# Patient Record
Sex: MALE | Race: WHITE | NOT HISPANIC OR LATINO | Employment: UNEMPLOYED | URBAN - METROPOLITAN AREA
[De-identification: names, ages, dates, MRNs, and addresses within clinical notes are randomized per-mention and may not be internally consistent; named-entity substitution may affect disease eponyms.]

---

## 2017-06-30 ENCOUNTER — HOSPITAL ENCOUNTER (OUTPATIENT)
Dept: RADIOLOGY | Facility: HOSPITAL | Age: 62
Discharge: HOME | End: 2017-06-30
Attending: NURSE PRACTITIONER
Payer: MEDICAID

## 2017-06-30 DIAGNOSIS — F17.210 CIGARETTE SMOKER: ICD-10-CM

## 2017-06-30 DIAGNOSIS — F17.210 CIGARETTE SMOKER: Primary | ICD-10-CM

## 2017-06-30 PROCEDURE — 71020 XR CHEST PA AND LATERAL: CPT | Mod: TC,PO

## 2018-10-14 ENCOUNTER — HOSPITAL ENCOUNTER (EMERGENCY)
Facility: HOSPITAL | Age: 63
Discharge: HOME | End: 2018-10-14
Attending: SURGERY
Payer: MEDICAID

## 2018-10-14 VITALS
HEIGHT: 71 IN | HEART RATE: 96 BPM | BODY MASS INDEX: 24.5 KG/M2 | RESPIRATION RATE: 18 BRPM | SYSTOLIC BLOOD PRESSURE: 155 MMHG | TEMPERATURE: 98 F | WEIGHT: 175 LBS | OXYGEN SATURATION: 97 % | DIASTOLIC BLOOD PRESSURE: 100 MMHG

## 2018-10-14 DIAGNOSIS — S40.012A CONTUSION OF LEFT SHOULDER, INITIAL ENCOUNTER: ICD-10-CM
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
## 2018-10-14 PROCEDURE — 99283 EMERGENCY DEPT VISIT LOW MDM: CPT

## 2018-10-15 NOTE — ED PROVIDER NOTES
"Encounter Date: 10/14/2018       History     Chief Complaint   Patient presents with    Fall     Fall off of a 5' ladder from top rung landing on left side/shoulder; states he did not hit head, no LOC "it knocked the wind out of me."  C/O pain left shoulder blade pain/left arm pain radiating down to elbow, no deformity noted, able to extend arm up and out, (+) distal pulses and cap refill     A 63-year-old male here today after sustaining a fall from a 5 ft ladder prior to arrival.  Patient reports that when he fell a cable broke his fall any subsequently landed on his posterior left shoulder.  He has full range of motion of his left shoulder, but reports he is still having pain to the posterior area.  He denies any hospital pretreatment prior to arrival or injuries or pain elsewhere.          Review of patient's allergies indicates:  No Known Allergies  Past Medical History:   Diagnosis Date    Bipolar 1 disorder     Depression     Kidney stone      History reviewed. No pertinent surgical history.  No family history on file.  Social History     Tobacco Use    Smoking status: Current Every Day Smoker     Packs/day: 1.00     Types: Cigarettes    Smokeless tobacco: Never Used   Substance Use Topics    Alcohol use: No     Alcohol/week: 0.0 oz    Drug use: Yes     Types: Cocaine     Review of Systems   Musculoskeletal: Positive for arthralgias. Negative for back pain, joint swelling and neck pain.   Skin: Negative for rash and wound.   All other systems reviewed and are negative.      Physical Exam     Initial Vitals [10/14/18 1924]   BP Pulse Resp Temp SpO2   (!) 155/100 96 18 97.6 °F (36.4 °C) 97 %      MAP       --         Physical Exam    Nursing note and vitals reviewed.  Constitutional: He appears well-developed and well-nourished. He is not diaphoretic. No distress.   HENT:   Head: Normocephalic and atraumatic.   Right Ear: External ear normal.   Left Ear: External ear normal.   Nose: Nose normal. "   Mouth/Throat: Oropharynx is clear and moist.   Eyes: Conjunctivae and EOM are normal.   Neck: Normal range of motion.   No cervical vertebral tenderness, step-off, or crepitus.   Cardiovascular:   +2 radial pulse to LUE   Pulmonary/Chest: No respiratory distress.   Respirations are even nonlabored.   Musculoskeletal:   Full range of motion of left shoulder.  Mild tenderness over left scapula without crepitus or swelling.   Neurological: He is alert and oriented to person, place, and time. He has normal strength.   Skin: Skin is warm and dry.   No erythema, ecchymosis, or abrasions to affected area.   Psychiatric: He has a normal mood and affect. His behavior is normal. Thought content normal.         ED Course   Procedures  Labs Reviewed - No data to display       Imaging Results          X-Ray Shoulder 2 or More Views Left (Final result)  Result time 10/14/18 19:59:25    Final result by Stephen Vieira MD (10/14/18 19:59:25)                 Impression:      No acute fracture or dislocation.      Electronically signed by: Stephen Vieira MD  Date:    10/14/2018  Time:    19:59             Narrative:    EXAMINATION:  XR SHOULDER COMPLETE 2 OR MORE VIEWS LEFT    CLINICAL HISTORY:  XR SHOULDER COMPLETE 2 OR MORE VIEWS LEFT    COMPARISON:  None    FINDINGS:  Three views of the left shoulder were obtained.    No evidence of acute fracture or dislocation.  Bony mineralization is normal.  Soft tissues are unremarkable.   Mild degenerative changes.                                              Attending Attestation:     Physician Attestation Statement for NP/PA:   I reviewed the chart but I did not personally examine the patient. The face to face encounter was performed by the NP/PA.                     Clinical Impression:   1.  Fall from ladder, initial encounter.  2.  Contusion left shoulder, initial encounter.    Disposition:   Disposition: Discharged                        Pearl Livingston NP  10/14/18 2042       Wili  ANAHI Elizabeth MD  10/19/18 4537

## 2021-03-13 ENCOUNTER — HOSPITAL ENCOUNTER (EMERGENCY)
Facility: HOSPITAL | Age: 66
Discharge: HOME | End: 2021-03-13
Attending: FAMILY MEDICINE
Payer: MEDICARE

## 2021-03-13 VITALS
SYSTOLIC BLOOD PRESSURE: 165 MMHG | RESPIRATION RATE: 14 BRPM | OXYGEN SATURATION: 96 % | BODY MASS INDEX: 23.8 KG/M2 | HEART RATE: 72 BPM | WEIGHT: 170 LBS | TEMPERATURE: 99 F | DIASTOLIC BLOOD PRESSURE: 92 MMHG | HEIGHT: 71 IN

## 2021-03-13 DIAGNOSIS — S61.211A LACERATION OF LEFT INDEX FINGER WITHOUT FOREIGN BODY WITHOUT DAMAGE TO NAIL, INITIAL ENCOUNTER: Primary | ICD-10-CM

## 2021-03-13 PROCEDURE — 25000003 PHARM REV CODE 250: Mod: ER | Performed by: PHYSICIAN ASSISTANT

## 2021-03-13 PROCEDURE — 12001 RPR S/N/AX/GEN/TRNK 2.5CM/<: CPT | Mod: ER

## 2021-03-13 PROCEDURE — 99283 EMERGENCY DEPT VISIT LOW MDM: CPT | Mod: 25,ER

## 2021-03-13 RX ORDER — LIDOCAINE HYDROCHLORIDE 10 MG/ML
10 INJECTION, SOLUTION EPIDURAL; INFILTRATION; INTRACAUDAL; PERINEURAL
Status: COMPLETED | OUTPATIENT
Start: 2021-03-13 | End: 2021-03-13

## 2021-03-13 RX ADMIN — BACITRACIN ZINC, POLYMYXIN B SULFATE, NEOMYCIN SULFATE 1 EACH: 400; 5000; 3.5 OINTMENT TOPICAL at 07:03

## 2021-03-13 RX ADMIN — LIDOCAINE HYDROCHLORIDE 100 MG: 10 INJECTION, SOLUTION EPIDURAL; INFILTRATION; INTRACAUDAL at 06:03

## 2023-09-16 ENCOUNTER — HOSPITAL ENCOUNTER (EMERGENCY)
Facility: HOSPITAL | Age: 68
Discharge: HOME | End: 2023-09-16
Attending: FAMILY MEDICINE
Payer: MEDICARE

## 2023-09-16 VITALS
BODY MASS INDEX: 24.5 KG/M2 | OXYGEN SATURATION: 93 % | SYSTOLIC BLOOD PRESSURE: 156 MMHG | RESPIRATION RATE: 17 BRPM | HEIGHT: 71 IN | DIASTOLIC BLOOD PRESSURE: 83 MMHG | TEMPERATURE: 98 F | HEART RATE: 74 BPM | WEIGHT: 175 LBS

## 2023-09-16 DIAGNOSIS — N20.0 KIDNEY STONE: Primary | ICD-10-CM

## 2023-09-16 DIAGNOSIS — R11.10 VOMITING: ICD-10-CM

## 2023-09-16 LAB
ALBUMIN SERPL BCP-MCNC: 4.8 G/DL (ref 3.5–5.2)
ALP SERPL-CCNC: 106 U/L (ref 38–126)
ALT SERPL W/O P-5'-P-CCNC: 35 U/L (ref 10–44)
AMPHET+METHAMPHET UR QL: NEGATIVE
AMYLASE SERPL-CCNC: 56 U/L (ref 30–110)
ANION GAP SERPL CALC-SCNC: 10 MMOL/L (ref 8–16)
AST SERPL-CCNC: 35 U/L (ref 15–46)
BACTERIA #/AREA URNS AUTO: ABNORMAL /HPF
BARBITURATES UR QL SCN>200 NG/ML: NEGATIVE
BASOPHILS # BLD AUTO: 0.04 K/UL (ref 0–0.2)
BASOPHILS NFR BLD: 0.3 % (ref 0–1.9)
BENZODIAZ UR QL SCN>200 NG/ML: NEGATIVE
BILIRUB SERPL-MCNC: 0.5 MG/DL (ref 0.1–1)
BILIRUB UR QL STRIP: NEGATIVE
BZE UR QL SCN: NEGATIVE
CALCIUM SERPL-MCNC: 9.7 MG/DL (ref 8.7–10.5)
CANNABINOIDS UR QL SCN: NEGATIVE
CHLORIDE SERPL-SCNC: 101 MMOL/L (ref 95–110)
CLARITY UR REFRACT.AUTO: CLEAR
CO2 SERPL-SCNC: 29 MMOL/L (ref 23–29)
COLOR UR AUTO: YELLOW
CREAT SERPL-MCNC: 1.7 MG/DL (ref 0.5–1.4)
CREAT UR-MCNC: 264.5 MG/DL (ref 23–375)
DIFFERENTIAL METHOD: ABNORMAL
EOSINOPHIL # BLD AUTO: 0 K/UL (ref 0–0.5)
EOSINOPHIL NFR BLD: 0.2 % (ref 0–8)
ERYTHROCYTE [DISTWIDTH] IN BLOOD BY AUTOMATED COUNT: 12.8 % (ref 11.5–14.5)
EST. GFR  (NO RACE VARIABLE): 43.6 ML/MIN/1.73 M^2
ETHANOL SERPL-MCNC: <10 MG/DL
GLUCOSE SERPL-MCNC: 174 MG/DL (ref 70–110)
GLUCOSE UR QL STRIP: NEGATIVE
HCT VFR BLD AUTO: 48.6 % (ref 40–54)
HGB BLD-MCNC: 16 G/DL (ref 14–18)
HGB UR QL STRIP: ABNORMAL
IMM GRANULOCYTES # BLD AUTO: 0.1 K/UL (ref 0–0.04)
IMM GRANULOCYTES NFR BLD AUTO: 0.7 % (ref 0–0.5)
KETONES UR QL STRIP: NEGATIVE
LEUKOCYTE ESTERASE UR QL STRIP: NEGATIVE
LIPASE SERPL-CCNC: 68 U/L (ref 23–300)
LYMPHOCYTES # BLD AUTO: 2.1 K/UL (ref 1–4.8)
LYMPHOCYTES NFR BLD: 14.9 % (ref 18–48)
MCH RBC QN AUTO: 29.9 PG (ref 27–31)
MCHC RBC AUTO-ENTMCNC: 32.9 G/DL (ref 32–36)
MCV RBC AUTO: 91 FL (ref 82–98)
METHADONE UR QL SCN>300 NG/ML: NEGATIVE
MICROSCOPIC COMMENT: ABNORMAL
MONOCYTES # BLD AUTO: 0.7 K/UL (ref 0.3–1)
MONOCYTES NFR BLD: 4.9 % (ref 4–15)
NEUTROPHILS # BLD AUTO: 10.9 K/UL (ref 1.8–7.7)
NEUTROPHILS NFR BLD: 79 % (ref 38–73)
NITRITE UR QL STRIP: NEGATIVE
NRBC BLD-RTO: 0 /100 WBC
NT-PROBNP SERPL-MCNC: <20 PG/ML (ref 5–900)
OPIATES UR QL SCN: NEGATIVE
PCP UR QL SCN>25 NG/ML: NEGATIVE
PH UR STRIP: 6 [PH] (ref 5–8)
PLATELET # BLD AUTO: 285 K/UL (ref 150–450)
PMV BLD AUTO: 10.3 FL (ref 9.2–12.9)
POTASSIUM SERPL-SCNC: 5.1 MMOL/L (ref 3.5–5.1)
PROT SERPL-MCNC: 8.1 G/DL (ref 6–8.4)
PROT UR QL STRIP: NEGATIVE
RBC # BLD AUTO: 5.36 M/UL (ref 4.6–6.2)
RBC #/AREA URNS AUTO: 50 /HPF (ref 0–4)
SODIUM SERPL-SCNC: 140 MMOL/L (ref 136–145)
SP GR UR STRIP: >=1.03 (ref 1–1.03)
TOXICOLOGY INFORMATION: NORMAL
TROPONIN I SERPL-MCNC: <0.012 NG/ML (ref 0.01–0.03)
URN SPEC COLLECT METH UR: ABNORMAL
UROBILINOGEN UR STRIP-ACNC: NEGATIVE EU/DL
UUN UR-MCNC: 26 MG/DL (ref 2–20)
WBC # BLD AUTO: 13.78 K/UL (ref 3.9–12.7)
WBC #/AREA URNS AUTO: 0 /HPF (ref 0–5)

## 2023-09-16 PROCEDURE — 84484 ASSAY OF TROPONIN QUANT: CPT | Mod: ER | Performed by: EMERGENCY MEDICINE

## 2023-09-16 PROCEDURE — 93010 EKG 12-LEAD: ICD-10-PCS | Mod: ,,, | Performed by: INTERNAL MEDICINE

## 2023-09-16 PROCEDURE — 96372 THER/PROPH/DIAG INJ SC/IM: CPT | Performed by: EMERGENCY MEDICINE

## 2023-09-16 PROCEDURE — 82150 ASSAY OF AMYLASE: CPT | Mod: ER | Performed by: FAMILY MEDICINE

## 2023-09-16 PROCEDURE — 93010 ELECTROCARDIOGRAM REPORT: CPT | Mod: ,,, | Performed by: INTERNAL MEDICINE

## 2023-09-16 PROCEDURE — 82077 ASSAY SPEC XCP UR&BREATH IA: CPT | Mod: ER | Performed by: EMERGENCY MEDICINE

## 2023-09-16 PROCEDURE — 99900035 HC TECH TIME PER 15 MIN (STAT): Mod: ER

## 2023-09-16 PROCEDURE — 96375 TX/PRO/DX INJ NEW DRUG ADDON: CPT | Mod: ER

## 2023-09-16 PROCEDURE — 85025 COMPLETE CBC W/AUTO DIFF WBC: CPT | Mod: ER | Performed by: FAMILY MEDICINE

## 2023-09-16 PROCEDURE — 25000003 PHARM REV CODE 250: Mod: ER | Performed by: EMERGENCY MEDICINE

## 2023-09-16 PROCEDURE — 63600175 PHARM REV CODE 636 W HCPCS: Mod: ER | Performed by: EMERGENCY MEDICINE

## 2023-09-16 PROCEDURE — 96361 HYDRATE IV INFUSION ADD-ON: CPT | Mod: ER

## 2023-09-16 PROCEDURE — 81000 URINALYSIS NONAUTO W/SCOPE: CPT | Mod: 59,ER | Performed by: FAMILY MEDICINE

## 2023-09-16 PROCEDURE — 80307 DRUG TEST PRSMV CHEM ANLYZR: CPT | Mod: ER | Performed by: EMERGENCY MEDICINE

## 2023-09-16 PROCEDURE — 99285 EMERGENCY DEPT VISIT HI MDM: CPT | Mod: 25,ER

## 2023-09-16 PROCEDURE — 80053 COMPREHEN METABOLIC PANEL: CPT | Mod: ER | Performed by: FAMILY MEDICINE

## 2023-09-16 PROCEDURE — 93005 ELECTROCARDIOGRAM TRACING: CPT | Mod: ER

## 2023-09-16 PROCEDURE — 83880 ASSAY OF NATRIURETIC PEPTIDE: CPT | Mod: ER | Performed by: FAMILY MEDICINE

## 2023-09-16 PROCEDURE — 96374 THER/PROPH/DIAG INJ IV PUSH: CPT | Mod: ER

## 2023-09-16 PROCEDURE — 83690 ASSAY OF LIPASE: CPT | Mod: ER | Performed by: FAMILY MEDICINE

## 2023-09-16 RX ORDER — DICLOFENAC SODIUM 75 MG/1
75 TABLET, DELAYED RELEASE ORAL 2 TIMES DAILY
Qty: 60 TABLET | Refills: 0 | Status: SHIPPED | OUTPATIENT
Start: 2023-09-16

## 2023-09-16 RX ORDER — TAMSULOSIN HYDROCHLORIDE 0.4 MG/1
0.4 CAPSULE ORAL
Status: COMPLETED | OUTPATIENT
Start: 2023-09-16 | End: 2023-09-16

## 2023-09-16 RX ORDER — ONDANSETRON 2 MG/ML
4 INJECTION INTRAMUSCULAR; INTRAVENOUS
Status: COMPLETED | OUTPATIENT
Start: 2023-09-16 | End: 2023-09-16

## 2023-09-16 RX ORDER — KETOROLAC TROMETHAMINE 30 MG/ML
15 INJECTION, SOLUTION INTRAMUSCULAR; INTRAVENOUS
Status: COMPLETED | OUTPATIENT
Start: 2023-09-16 | End: 2023-09-16

## 2023-09-16 RX ORDER — ONDANSETRON 4 MG/1
4 TABLET, FILM COATED ORAL EVERY 6 HOURS
Qty: 12 TABLET | Refills: 0 | Status: SHIPPED | OUTPATIENT
Start: 2023-09-16

## 2023-09-16 RX ORDER — OXYCODONE AND ACETAMINOPHEN 5; 325 MG/1; MG/1
1 TABLET ORAL
Status: COMPLETED | OUTPATIENT
Start: 2023-09-16 | End: 2023-09-16

## 2023-09-16 RX ORDER — HALOPERIDOL 5 MG/ML
5 INJECTION INTRAMUSCULAR
Status: COMPLETED | OUTPATIENT
Start: 2023-09-16 | End: 2023-09-16

## 2023-09-16 RX ORDER — TAMSULOSIN HYDROCHLORIDE 0.4 MG/1
0.4 CAPSULE ORAL DAILY
Qty: 10 CAPSULE | Refills: 0 | Status: SHIPPED | OUTPATIENT
Start: 2023-09-16 | End: 2024-09-15

## 2023-09-16 RX ADMIN — TAMSULOSIN HYDROCHLORIDE 0.4 MG: 0.4 CAPSULE ORAL at 08:09

## 2023-09-16 RX ADMIN — HALOPERIDOL LACTATE 5 MG: 5 INJECTION, SOLUTION INTRAMUSCULAR at 06:09

## 2023-09-16 RX ADMIN — KETOROLAC TROMETHAMINE 15 MG: 30 INJECTION, SOLUTION INTRAMUSCULAR; INTRAVENOUS at 06:09

## 2023-09-16 RX ADMIN — OXYCODONE HYDROCHLORIDE AND ACETAMINOPHEN 1 TABLET: 5; 325 TABLET ORAL at 08:09

## 2023-09-16 RX ADMIN — SODIUM CHLORIDE 1000 ML: 9 INJECTION, SOLUTION INTRAVENOUS at 06:09

## 2023-09-16 RX ADMIN — ONDANSETRON 4 MG: 2 INJECTION INTRAMUSCULAR; INTRAVENOUS at 06:09

## 2023-09-16 NOTE — ED NOTES
Family member left phone number for patient when he is ready for discharge: Tory [phone]; Darline [phone].

## 2023-09-16 NOTE — ED PROVIDER NOTES
Encounter Date: 9/16/2023       History     Chief Complaint   Patient presents with    Abdominal Pain     Patient presents to ED with Right lower quad abdominal pain that started 04:15 pm with nausea and vomiting. He is diaphoretic in triage.      HPI  67 y.o.   Co sudden onset RLQ/R flank pain and emesis w/o diarrhea x about 1.5 hrs  Denies h/o this  Denies pshx to abd  No exac/remitting factors  Denies etoh, drugs  Chart review shows cocaine  Hx from patient, limited due to cooperation/vomiting    Review of patient's allergies indicates:  No Known Allergies  Past Medical History:   Diagnosis Date    Bipolar 1 disorder     Diabetes mellitus     Hypertension      History reviewed. No pertinent surgical history.  History reviewed. No pertinent family history.  Social History     Tobacco Use    Smoking status: Every Day     Current packs/day: 1.00     Types: Cigarettes    Smokeless tobacco: Never   Substance Use Topics    Alcohol use: No     Alcohol/week: 0.0 standard drinks of alcohol    Drug use: Yes     Types: Cocaine     Review of Systems  All systems were reviewed/examined and were negative except as noted in the HPI.    Limited due to emesis  Physical Exam     Initial Vitals [09/16/23 1718]   BP Pulse Resp Temp SpO2   136/67 (!) 55 16 98.1 °F (36.7 °C) 97 %      MAP       --         Physical Exam    General: the patient is awake, alert, and vomiting  Head: normocephalic and atraumatic, sclera are clear  Neck: supple without meningismus  Chest: clear to auscultation bilaterally, no respiratory distress  Heart: regular rate and rhythm borderline glen  ABD soft, nontender, nondistended, no peritoneal signs  Extremities: warm and well perfused  Skin: warm and dry  Psych conversant  Neuro: awake, alert, moving all extremities    ED Course   Procedures  Labs Reviewed   CBC W/ AUTO DIFFERENTIAL - Abnormal; Notable for the following components:       Result Value    WBC 13.78 (*)     Immature Granulocytes 0.7 (*)      Gran # (ANC) 10.9 (*)     Immature Grans (Abs) 0.10 (*)     Gran % 79.0 (*)     Lymph % 14.9 (*)     All other components within normal limits   COMPREHENSIVE METABOLIC PANEL - Abnormal; Notable for the following components:    Glucose 174 (*)     BUN 26 (*)     Creatinine 1.70 (*)     eGFR 43.6 (*)     All other components within normal limits   URINALYSIS, REFLEX TO URINE CULTURE - Abnormal; Notable for the following components:    Specific Gravity, UA >=1.030 (*)     Occult Blood UA 3+ (*)     All other components within normal limits    Narrative:     Preferred Collection Type->Urine, Clean Catch  Specimen Source->Urine   URINALYSIS MICROSCOPIC - Abnormal; Notable for the following components:    RBC, UA 50 (*)     All other components within normal limits    Narrative:     Preferred Collection Type->Urine, Clean Catch  Specimen Source->Urine   NT-PRO NATRIURETIC PEPTIDE   AMYLASE   LIPASE   TROPONIN I   ALCOHOL,MEDICAL (ETHANOL)   DRUG SCREEN PANEL, URINE EMERGENCY    Narrative:     Preferred Collection Type->Urine, Clean Catch  Specimen Source->Urine          Imaging Results              CT Abdomen Pelvis  Without Contrast (Final result)  Result time 09/16/23 20:03:28      Final result by Danny Kurtz MD (09/16/23 20:03:28)                   Impression:      Right-sided hydronephrosis and right ureter.  A passed stone on the right side near the right UVJ measuring 2 mm. No left-sided hydronephrosis.  Mild right perinephric fat stranding.    All CT scans   are performed using dose optimization techniques including the following: automated exposure control; adjustment of the mA and/or kV; use of iterative reconstruction technique.  Dose modulation was employed for ALARA by means of: Automated exposure control; adjustment of the mA and/or kV according to patient size (this includes techniques or standardized protocols for targeted exams where dose is matched to indication/reason for exam; i.e. extremities or  head); and/or use of iterative reconstructive technique.      Electronically signed by: Danny Tessie  Date:    09/16/2023  Time:    20:03               Narrative:    EXAMINATION:  CT ABDOMEN PELVIS WITHOUT CONTRAST    CLINICAL HISTORY:  Abdominal pain, acute, nonlocalized;    TECHNIQUE:  Low dose axial images, sagittal and coronal reformations were obtained from the lung bases to the pubic symphysis, .    COMPARISON:  None    FINDINGS:  Heart: Normal in size as far as seen.  No pericardial effusion as far seen.    Lung Bases: Well aerated, without consolidation or pleural fluid.    Liver: Normal in size and attenuation, with no focal hepatic lesions.    Gallbladder: No calcified gallstones.    Bile Ducts: No evidence of dilated ducts.    Pancreas: No mass or peripancreatic fat stranding.    Spleen: Unremarkable.    Adrenals: Unremarkable.    Kidneys/ Ureters: Right-sided hydronephrosis and right ureter.  A passed stone on the right side near the right UVJ measuring 2 mm.  No left-sided hydronephrosis.  Mild right perinephric fat stranding.    Bladder: No evidence of wall thickening.    Reproductive organs: Unremarkable.    GI Tract/Mesentery: No evidence of bowel obstruction or inflammation. No secondary signs of appendicitis.  Hiatal hernia.  Colonic diverticulosis I think only with the lower    Peritoneal Space: No ascites. No free air.    Retroperitoneum: No significant adenopathy.    Abdominal wall: Unremarkable.    Vasculature: No aneurysm.  Atherosclerotic changes.    Bones: No acute fracture.                                       Medications   ondansetron injection 4 mg (4 mg Intravenous Given 9/16/23 1833)   sodium chloride 0.9% bolus 1,000 mL 1,000 mL (0 mLs Intravenous Stopped 9/16/23 2055)   ketorolac injection 15 mg (15 mg Intravenous Given 9/16/23 1841)   haloperidol lactate injection 5 mg (5 mg Intramuscular Given 9/16/23 1842)   tamsulosin 24 hr capsule 0.4 mg (0.4 mg Oral Given 9/16/23 2013)    oxyCODONE-acetaminophen 5-325 mg per tablet 1 tablet (1 tablet Oral Given 9/16/23 2013)     Medical Decision Making  Amount and/or Complexity of Data Reviewed  Labs: ordered.  Radiology: ordered.    Risk  Prescription drug management.                  Medical Decision Making:    This is an emergent evaluation of a patient presenting to the ED.  Nursing notes were reviewed.  Kidney stone  I reviewed radiology images personally along with interpretations.  I personally reviewed and interpreted the laboratory results.  No uti    I decided to obtain and review old medical records, which showed: ED visits    Evaluation for Emergency Medical Condition  The patient received a medical screening exam and within a reasonable degree of clinical confidence an emergency medical condition has not been identified.  The patient is instructed on proper follow up and return precautions to the ED.    The patient was encouraged strongly to get the COVID-19 vaccine either after asymptomatic (if COVID positive) or offered it here in the ED is COVID negative.  The patient was also encouraged to obtain an influenza vaccination if available once asymptomatic (if positive) or if testing negative in the ED.    The patient was encouraged and counseled to quit smoking and use of tobacco products, including the effect on their health.  Smoking cessation resources were provided.      Luis Browning MD, JEANNIE       Uro ref  Improved            Clinical Impression:   Final diagnoses:  [R11.10] Vomiting  [N20.0] Kidney stone (Primary)        ED Disposition Condition    Discharge Stable          ED Prescriptions       Medication Sig Dispense Start Date End Date Auth. Provider    diclofenac (VOLTAREN) 75 MG EC tablet Take 1 tablet (75 mg total) by mouth 2 (two) times daily. 60 tablet 9/16/2023 -- Dickson Browning MD    ondansetron (ZOFRAN) 4 MG tablet Take 1 tablet (4 mg total) by mouth every 6 (six) hours. 12 tablet 9/16/2023 -- Dickson Browning MD     tamsulosin (FLOMAX) 0.4 mg Cap Take 1 capsule (0.4 mg total) by mouth once daily. 10 capsule 9/16/2023 9/15/2024 Dickson Browning MD          Follow-up Information       Follow up With Specialties Details Why Contact Info Additional Information    Rafia Stubbs, NP Family Medicine Schedule an appointment as soon as possible for a visit   [address]  [address]  [place] [address]  [phone]       [facility] U[facility] Urology Schedule an appointment as soon as possible for a visit   [address], [address]  [place] [address] [phone] Please park in surface lot and check in at [address].          Discharged to home in stable condition, return to ED warnings given, follow up and patient care instructions given.      Luis Browning MD, JEANNIE, FACEP  Department of Emergency Medicine       Dickson Browning MD  09/17/23 5657     no

## 2023-09-17 NOTE — ED NOTES
"Patient is being verbally abusive to nursing and radiology staff. He initially did told radiology that he would not be able to lay flat to do the CT scan because it hurts his back and  makes him nauseated. I went in and informed him that the CT needed to be done so that we could help him to figure out what was going on. He then stated," I don't know what the hell she is talking about". Patient eventually went to CT, but was cursing at the radiology tech while she was trying to perform care.   "

## 2023-09-17 NOTE — ED NOTES
Patient had no c/o pain upon walking out of ER with his niece. Patient was sleeping and would not stay wake for discharge instructions. No vomiting since Haldol given.

## 2024-02-28 ENCOUNTER — HOSPITAL ENCOUNTER (OUTPATIENT)
Dept: RADIOLOGY | Facility: HOSPITAL | Age: 69
Discharge: HOME | End: 2024-02-28
Attending: INTERNAL MEDICINE
Payer: MEDICARE

## 2024-02-28 DIAGNOSIS — Z72.0 TOBACCO ABUSE: ICD-10-CM

## 2024-02-28 DIAGNOSIS — Z72.0 TOBACCO ABUSE: Primary | ICD-10-CM

## 2024-02-28 PROCEDURE — 76706 US ABDL AORTA SCREEN AAA: CPT | Mod: TC,PN

## 2024-02-28 PROCEDURE — 76706 US ABDL AORTA SCREEN AAA: CPT | Mod: 26,,, | Performed by: RADIOLOGY

## 2024-03-01 DIAGNOSIS — Z72.0 TOBACCO USE: Primary | ICD-10-CM

## 2024-03-05 DIAGNOSIS — F17.210 CIGARETTE SMOKER: Primary | ICD-10-CM

## 2024-03-11 ENCOUNTER — HOSPITAL ENCOUNTER (OUTPATIENT)
Dept: RADIOLOGY | Facility: HOSPITAL | Age: 69
Discharge: HOME | End: 2024-03-11
Attending: INTERNAL MEDICINE
Payer: MEDICARE

## 2024-03-11 DIAGNOSIS — F17.210 CIGARETTE SMOKER: ICD-10-CM

## 2024-03-11 PROCEDURE — 71271 CT THORAX LUNG CANCER SCR C-: CPT | Mod: 26,,, | Performed by: RADIOLOGY

## 2024-03-11 PROCEDURE — 71271 CT THORAX LUNG CANCER SCR C-: CPT | Mod: TC,PN

## 2024-07-04 ENCOUNTER — HOSPITAL ENCOUNTER (EMERGENCY)
Facility: HOSPITAL | Age: 69
Discharge: HOME | End: 2024-07-04
Attending: EMERGENCY MEDICINE
Payer: MEDICARE

## 2024-07-04 ENCOUNTER — HOSPITAL ENCOUNTER (EMERGENCY)
Facility: HOSPITAL | Age: 69
Discharge: HOME | End: 2024-07-04
Attending: FAMILY MEDICINE
Payer: MEDICARE

## 2024-07-04 VITALS
HEIGHT: 71 IN | RESPIRATION RATE: 19 BRPM | SYSTOLIC BLOOD PRESSURE: 101 MMHG | TEMPERATURE: 98 F | OXYGEN SATURATION: 100 % | WEIGHT: 179.56 LBS | BODY MASS INDEX: 25.14 KG/M2 | HEART RATE: 76 BPM | DIASTOLIC BLOOD PRESSURE: 53 MMHG

## 2024-07-04 VITALS
OXYGEN SATURATION: 99 % | RESPIRATION RATE: 17 BRPM | HEART RATE: 91 BPM | DIASTOLIC BLOOD PRESSURE: 66 MMHG | BODY MASS INDEX: 25.2 KG/M2 | WEIGHT: 180 LBS | SYSTOLIC BLOOD PRESSURE: 111 MMHG | HEIGHT: 71 IN | TEMPERATURE: 98 F

## 2024-07-04 DIAGNOSIS — F15.10 AMPHETAMINE ABUSE: ICD-10-CM

## 2024-07-04 DIAGNOSIS — F15.10 METHAMPHETAMINE ABUSE, EPISODIC: Primary | ICD-10-CM

## 2024-07-04 DIAGNOSIS — F14.10 COCAINE ABUSE: ICD-10-CM

## 2024-07-04 DIAGNOSIS — F23 ACUTE PSYCHOSIS: Primary | ICD-10-CM

## 2024-07-04 LAB
ALBUMIN SERPL BCP-MCNC: 4.6 G/DL (ref 3.5–5.2)
ALP SERPL-CCNC: 77 U/L (ref 38–126)
ALT SERPL W/O P-5'-P-CCNC: 36 U/L (ref 10–44)
AMPHET+METHAMPHET UR QL: ABNORMAL
ANION GAP SERPL CALC-SCNC: 13 MMOL/L (ref 8–16)
APAP SERPL-MCNC: <10 UG/ML (ref 10–20)
AST SERPL-CCNC: 39 U/L (ref 15–46)
BACTERIA #/AREA URNS AUTO: ABNORMAL /HPF
BARBITURATES UR QL SCN>200 NG/ML: NEGATIVE
BASOPHILS # BLD AUTO: 0.03 K/UL (ref 0–0.2)
BASOPHILS NFR BLD: 0.4 % (ref 0–1.9)
BENZODIAZ UR QL SCN>200 NG/ML: NEGATIVE
BILIRUB SERPL-MCNC: 0.6 MG/DL (ref 0.1–1)
BILIRUB UR QL STRIP: NEGATIVE
BZE UR QL SCN: ABNORMAL
CALCIUM SERPL-MCNC: 9.3 MG/DL (ref 8.7–10.5)
CANNABINOIDS UR QL SCN: NEGATIVE
CHLORIDE SERPL-SCNC: 96 MMOL/L (ref 95–110)
CLARITY UR REFRACT.AUTO: CLEAR
CO2 SERPL-SCNC: 30 MMOL/L (ref 23–29)
COLOR UR AUTO: YELLOW
CREAT SERPL-MCNC: 1.17 MG/DL (ref 0.5–1.4)
CREAT UR-MCNC: 266.6 MG/DL (ref 23–375)
DIFFERENTIAL METHOD BLD: ABNORMAL
EOSINOPHIL # BLD AUTO: 0 K/UL (ref 0–0.5)
EOSINOPHIL NFR BLD: 0 % (ref 0–8)
ERYTHROCYTE [DISTWIDTH] IN BLOOD BY AUTOMATED COUNT: 12.8 % (ref 11.5–14.5)
EST. GFR  (NO RACE VARIABLE): >60 ML/MIN/1.73 M^2
ETHANOL SERPL-MCNC: <10 MG/DL
GLUCOSE SERPL-MCNC: 104 MG/DL (ref 70–110)
GLUCOSE UR QL STRIP: NEGATIVE
HCT VFR BLD AUTO: 45.3 % (ref 40–54)
HGB BLD-MCNC: 15.1 G/DL (ref 14–18)
HGB UR QL STRIP: NEGATIVE
HYALINE CASTS UR QL AUTO: 5 /LPF
IMM GRANULOCYTES # BLD AUTO: 0.04 K/UL (ref 0–0.04)
IMM GRANULOCYTES NFR BLD AUTO: 0.5 % (ref 0–0.5)
KETONES UR QL STRIP: ABNORMAL
LEUKOCYTE ESTERASE UR QL STRIP: ABNORMAL
LYMPHOCYTES # BLD AUTO: 1 K/UL (ref 1–4.8)
LYMPHOCYTES NFR BLD: 13.6 % (ref 18–48)
MCH RBC QN AUTO: 30.1 PG (ref 27–31)
MCHC RBC AUTO-ENTMCNC: 33.3 G/DL (ref 32–36)
MCV RBC AUTO: 90 FL (ref 82–98)
METHADONE UR QL SCN>300 NG/ML: NEGATIVE
MICROSCOPIC COMMENT: ABNORMAL
MONOCYTES # BLD AUTO: 0.9 K/UL (ref 0.3–1)
MONOCYTES NFR BLD: 11.2 % (ref 4–15)
NEUTROPHILS # BLD AUTO: 5.6 K/UL (ref 1.8–7.7)
NEUTROPHILS NFR BLD: 74.3 % (ref 38–73)
NITRITE UR QL STRIP: NEGATIVE
NRBC BLD-RTO: 0 /100 WBC
OPIATES UR QL SCN: NEGATIVE
PCP UR QL SCN>25 NG/ML: NEGATIVE
PH UR STRIP: 6 [PH] (ref 5–8)
PLATELET # BLD AUTO: 236 K/UL (ref 150–450)
PMV BLD AUTO: 9.7 FL (ref 9.2–12.9)
POTASSIUM SERPL-SCNC: 4 MMOL/L (ref 3.5–5.1)
PROT SERPL-MCNC: 7.7 G/DL (ref 6–8.4)
PROT UR QL STRIP: ABNORMAL
RBC # BLD AUTO: 5.02 M/UL (ref 4.6–6.2)
RBC #/AREA URNS AUTO: 1 /HPF (ref 0–4)
SODIUM SERPL-SCNC: 139 MMOL/L (ref 136–145)
SP GR UR STRIP: >=1.03 (ref 1–1.03)
TOXICOLOGY INFORMATION: ABNORMAL
URN SPEC COLLECT METH UR: ABNORMAL
UROBILINOGEN UR STRIP-ACNC: 1 EU/DL
UUN UR-MCNC: 23 MG/DL (ref 2–20)
WBC # BLD AUTO: 7.56 K/UL (ref 3.9–12.7)
WBC #/AREA URNS AUTO: 10 /HPF (ref 0–5)

## 2024-07-04 PROCEDURE — 81000 URINALYSIS NONAUTO W/SCOPE: CPT | Mod: 59,ER | Performed by: FAMILY MEDICINE

## 2024-07-04 PROCEDURE — 82077 ASSAY SPEC XCP UR&BREATH IA: CPT | Mod: ER | Performed by: FAMILY MEDICINE

## 2024-07-04 PROCEDURE — 80307 DRUG TEST PRSMV CHEM ANLYZR: CPT | Mod: ER | Performed by: FAMILY MEDICINE

## 2024-07-04 PROCEDURE — 85025 COMPLETE CBC W/AUTO DIFF WBC: CPT | Mod: ER | Performed by: FAMILY MEDICINE

## 2024-07-04 PROCEDURE — 99285 EMERGENCY DEPT VISIT HI MDM: CPT | Mod: 27,ER

## 2024-07-04 PROCEDURE — 80053 COMPREHEN METABOLIC PANEL: CPT | Mod: ER | Performed by: FAMILY MEDICINE

## 2024-07-04 PROCEDURE — 25000003 PHARM REV CODE 250: Mod: ER | Performed by: FAMILY MEDICINE

## 2024-07-04 PROCEDURE — 99283 EMERGENCY DEPT VISIT LOW MDM: CPT | Mod: ER

## 2024-07-04 PROCEDURE — 80143 DRUG ASSAY ACETAMINOPHEN: CPT | Mod: ER | Performed by: FAMILY MEDICINE

## 2024-07-04 RX ORDER — DIPHENHYDRAMINE HCL 50 MG
50 CAPSULE ORAL
Status: COMPLETED | OUTPATIENT
Start: 2024-07-04 | End: 2024-07-04

## 2024-07-04 RX ADMIN — DIPHENHYDRAMINE HYDROCHLORIDE 50 MG: 50 CAPSULE ORAL at 10:07

## 2024-07-04 NOTE — ED NOTES
This RN contacted [facility]     Spoke with Colonel Carrillo to confirm pts medical necessity after advising patient was just seen here, evaluated and discharged. Colonel Carrillo advises if patient is discharged they will send patient back to facility. Stating [employer] did not want to press charges when patient was alleged to have attempted to break in to business    Pt denies allegations.  Pt denies SI, HI, AH or VH.

## 2024-07-04 NOTE — CONSULTS
[facility]  Psychiatry  Telepsychiatry Consult Note    Please see previous notes:    Patient agreeable to consultation via telepsychiatry.    Tele-Consultation from Psychiatry started: 7/4/2024 at 8:53PM  The chief complaint leading to psychiatric consultation is: Substance abuse, psychosis  This consultation was requested by Dr. Haque, the Emergency Department attending physician.  The location of the consulting psychiatrist is  [place] .  The patient location is  [facility] EMERGENCY DEPARTMENT   The patient arrived at the ED at: 0617    Also present with the patient at the time of the consultation: NA    Patient Identification:   Braxton Ayoub is a 68 y.o. male.    Patient information was obtained from patient, past medical records, and ER records.      Inpatient consult to Telemedicine - Psychiatry  Consult performed by: Nayely Zuleta MD  Consult ordered by: Molina Haque MD  Reason for consult: Substance abuse, psychosis        Teleconsult Time Documentation  Subjective:     History of Present Illness:  Per ED MD note:    68-year-old male brought to ED by EMS.  Patient has been discharged from ED few hours ago for wandering on streets and medical evaluation.  Dr. Llanos did not think he qualifies for PEC.  Earlier patient told staff that he did Meth.  Police claims he did not do crime at [facility].  And he has no charges.  Patient claims he used to work at [employer] and is now retired.  Patient wanted to be discharged and will walk home since it is daylight now.      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  Patient reports he consumed a moderate amount of methamphetamines today.  He was found wandering in the road endorsing auditory and visual hallucinations EMS was activated and brought patient to the ER for further evaluation.  Patient arrived in the ER no  "acute distress he was acting appropriate he denies any HI or SI no longer has auditory hallucinations.  He was no complaints at this time       Psychiatry Eval  Pt seen via secure video visit. Reports that he is in the ED for "not a thing." Says that they had released him when he came in earlier, needed to call for a ride, says that the security guards wouldn't let him make a phone call and put him out on the streets. Reports he went over the [employer] where he works, but that he works a different shift and the people working night shift didn't know him, police were called and he was brought back to the ED. Per notes, police did not arrest patient, he did not commit a crime at [facility]. Says now he would like to leave and they won't let him walk home, even though they put him out to do this in the middle of hte night. Tried to call his 2 nieces to come get him but no one answered, rest of his numbers are in his cell phone which is at home.   Reports that he is supposed to work today, unable to call [employer], so worried about getting a point against him for missing work. Reports he has been hospitalized for depression and drug problems but that it was a very long time ago, when he was in his 30's. Reports drug use has been better than before. Mainly uses cocaine, says not as much as he used to.   Denies any suicidal thoughts, no homicidal thoughts. Would like to get home one way or another, whether he walks or gets a ride. Denies other concerns.     Psychiatric History:   Previous Psychiatric Hospitalizations: Yes Reports "a long time ago". Chart revieiw with one admission noted in 2016 for depression in context of substance use, 2 prior mentioned in 2003, 2008 for similar  Previous Medication Trials: Yes Remote, nothing recent per pt  Previous Suicide Attempts: no   History of Violence: Denies  History of Depression: Yes, remote  History of Arabella: Denies  History of Auditory/Visual Hallucination In context of stimulant " "use  History of Delusions: In context of drug use  Outpatient psychiatrist (current & past): No    Substance Abuse History:  Tobacco:Yes  Alcohol: No  Illicit Substances:Yes, cocaine, meth  Detox/Rehab: Yes, remote    Legal History: Past charges/incarcerations: No current charges, per chart review, incarcerated for 2 years after breaking into a [employer] the [facility].    Family Psychiatric History: Deferred      Social History:  *Education:Associate's Degree  Employment Status/Finances:Employed   Relationship Status/Sexual Orientation: Single:    Children: 0  Housing Status: Home    history:  NO  Access to gun: NO  Pentecostalism:Spiritual without formal affiliation    Psychiatric Mental Status Exam:  Arousal: alert  Sensorium/Orientation: oriented to grossly intact  Behavior/Cooperation: restless and fidgety    Speech: normal rate, normal volume, mumbles, but also covers face with mask/hands  Language: grossly intact  Mood: " Just want to go home "   Affect: irritable  Thought Process:  Fairly linear, occ circumstantial  Thought Content:   Auditory hallucinations: NO  Visual hallucinations: NO  Paranoia: NO  Delusions:  NO  Suicidal ideation: NO  Homicidal ideation: NO  Attention/Concentration:  intact  Memory:    Recent:  Intact   Remote: Intact  Insight: has awareness of illness  Judgment: behavior is adequate to circumstances, was limited by intoxication      Past Medical History:   Past Medical History:   Diagnosis Date    Bipolar 1 disorder     Diabetes mellitus     Hypertension       Laboratory Data:   Labs Reviewed   DRUG SCREEN PANEL, URINE EMERGENCY - Abnormal; Notable for the following components:       Result Value    Cocaine (Metab.) Presumptive Positive (*)     All other components within normal limits    Narrative:     Preferred Collection Type->Urine, Clean Catch  Specimen Source->Urine   URINALYSIS, REFLEX TO URINE CULTURE - Abnormal; Notable for the following components:    Specific Gravity, UA " >=1.030 (*)     Protein, UA 1+ (*)     Ketones, UA Trace (*)     Leukocytes, UA Trace (*)     All other components within normal limits    Narrative:     Preferred Collection Type->Urine, Clean Catch  Specimen Source->Urine   URINALYSIS MICROSCOPIC - Abnormal; Notable for the following components:    WBC, UA 10 (*)     Hyaline Casts, UA 5 (*)     All other components within normal limits    Narrative:     Preferred Collection Type->Urine, Clean Catch  Specimen Source->Urine   CBC W/ AUTO DIFFERENTIAL - Abnormal; Notable for the following components:    Gran % 74.3 (*)     Lymph % 13.6 (*)     All other components within normal limits   COMPREHENSIVE METABOLIC PANEL - Abnormal; Notable for the following components:    CO2 30 (*)     BUN 23 (*)     All other components within normal limits   ALCOHOL,MEDICAL (ETHANOL)   ACETAMINOPHEN LEVEL       Allergies:   Review of patient's allergies indicates:  No Known Allergies    Medications in ER: Medications - No data to display    Medications at home:   No current facility-administered medications on file prior to encounter.     Current Outpatient Medications on File Prior to Encounter   Medication Sig Dispense Refill    diclofenac (VOLTAREN) 75 MG EC tablet Take 1 tablet (75 mg total) by mouth 2 (two) times daily. 60 tablet 0    ondansetron (ZOFRAN) 4 MG tablet Take 1 tablet (4 mg total) by mouth every 6 (six) hours. 12 tablet 0    tamsulosin (FLOMAX) 0.4 mg Cap Take 1 capsule (0.4 mg total) by mouth once daily. 10 capsule 0        No new subjective & objective note has been filed under this hospital service since the last note was generated.      Assessment - Diagnosis - Goals:     Diagnosis/Impression:   67 yo male with hx of stimulant use d/o, previous presentations for SI, psychosis in context of substance use, but none in our system since 2016. Initially presented earlier today with hallucinations/paranoia, related to substance intoxication, cleared and was discharged,  but brought back after he was found wandering outside. On interview, pt denies suicidality, does endorse cocaine use, which likely contributes to initial presentation. Symptoms largely driven by acute stimulant use and suspect long term cognitive effects of drug use. Does not meet criteria for involuntary hospitalization at this time.     DDX  Stimulant use d/o  Stimulant intoxication    Rec:   Legal: Does not meet criteria for PEC, not a danger to self or others, nor gravely disabled.   No scheduled meds indicated  Pt expressed willingness to consider outpatient substance abuse treatment options, cna provide in instructions.      Time with patient: 18 min  Additiona time (chart review, documentation): 15 min      More than 50% of the time was spent counseling/coordinating care    Consulting clinician was informed of the encounter and consult note.    Consultation ended: 7/4/2024 at 9:35 AM    Nayely Zuleta MD   Psychiatry  [facility]

## 2024-07-04 NOTE — ED NOTES
Pt provided with a pitcher of water.   Pt notified that a urine sample is needed and provided with a urinal.

## 2024-07-04 NOTE — ED PROVIDER NOTES
"Encounter Date: 7/4/2024       History     Chief Complaint   Patient presents with    Ingestion     Pt [facility] , unit # [id number] reports pt was found by PD wandering in the road with AH and VH reporting he took "copious" amt of "meth"      HPI    This is a 68-year-old male who presents the ER for evaluation of meth induced mood disorder.  Patient reports he consumed a moderate amount of methamphetamines today.  He was found wandering in the road endorsing auditory and visual hallucinations EMS was activated and brought patient to the ER for further evaluation.  Patient arrived in the ER no acute distress he was acting appropriate he denies any HI or SI no longer has auditory hallucinations.  He was no complaints at this time    Review of patient's allergies indicates:  No Known Allergies  Past Medical History:   Diagnosis Date    Bipolar 1 disorder     Diabetes mellitus     Hypertension      No past surgical history on file.  No family history on file.  Social History     Tobacco Use    Smoking status: Every Day     Current packs/day: 1.00     Types: Cigarettes    Smokeless tobacco: Never   Substance Use Topics    Alcohol use: No     Alcohol/week: 0.0 standard drinks of alcohol    Drug use: Yes     Types: Cocaine     Review of Systems   Psychiatric/Behavioral:  The patient is hyperactive.    All other systems reviewed and are negative.      Physical Exam     Initial Vitals [07/04/24 0220]   BP Pulse Resp Temp SpO2   107/75 102 18 98.1 °F (36.7 °C) 99 %      MAP       --         Physical Exam    Nursing note and vitals reviewed.  Constitutional: He appears well-developed and well-nourished. No distress.   HENT:   Head: Normocephalic and atraumatic.   Eyes: Pupils are equal, round, and reactive to light.   Neck:   Normal range of motion.  Pulmonary/Chest: No respiratory distress.   Musculoskeletal:         General: Normal range of motion.      Cervical back: Normal range of motion.     Neurological: He is " alert and oriented to person, place, and time. He has normal strength. GCS score is 15. GCS eye subscore is 4. GCS verbal subscore is 5. GCS motor subscore is 6.   Skin: Skin is dry. Capillary refill takes less than 2 seconds.   Psychiatric:   Not aggressive or violent, appropriate insight and judgment denies active hallucinations         ED Course   Procedures  Labs Reviewed - No data to display       Imaging Results    None          Medications - No data to display  Medical Decision Making  60-year-old male presents the ER for evaluation of meth endorsed mood disorder.  Consumed a copious amount of methamphetamine as found wandering endorsing hallucinations but those seem to have improved.  He was resting comfortably in bed no distress he denies HI SI no active hallucinations he was not aggressive or violent and very easily directable.  Likely substance induced mood disorder.  No indication for pec at this time discussed with patient will plan to observe and reassess anticipate discharge if no aggressive behavior are signs of symptoms of concerning psychiatric illness    Amount and/or Complexity of Data Reviewed  Independent Historian: EMS               ED Course as of 07/04/24 0420   Thu Jul 04, 2024   0342 Resting in bed no distress will continue to observe [SE]   0356 Resting in bed no distress patient reports he wishes to be discharged, he was not aggressive or violent, no HI or SI no active hallucinations at this time, no need for pec.  Discussed with patient need to discontinue methamphetamine another illicit drugs.  Return precautions discussed patient to be discharged [SE]      ED Course User Index  [SE] Romi Loja MD                           Clinical Impression:  Final diagnoses:  [F15.10] Methamphetamine abuse, episodic (Primary)          ED Disposition Condition    Discharge Stable          ED Prescriptions    None       Follow-up Information       Follow up With Specialties Details Why  Contact Info    Rafia Stubbs, NP Family Medicine Schedule an appointment as soon as possible for a visit  As needed [address] [place] [address]  [address]  [facility] [address]  [phone]               Romi Loja MD  07/04/24 0420

## 2024-07-04 NOTE — ED NOTES
Pt denies AH, VH, SI or HI.  Pt self reports has been using meth and cocaine over the last few weeks, reports last used on Tuesday

## 2024-07-04 NOTE — ED PROVIDER NOTES
Encounter Date: 7/4/2024       History     Chief Complaint   Patient presents with    medical evaluation     Pt arrived via [facility] unit # [id number] , with reports pt was trying to break into [facility] sent him here for evaluation stating he was not committing a crime.     68-year-old male brought to ED by EMS.  Patient has been discharged from ED few hours ago for wandering on streets and medical evaluation.  Dr. Llanos did not think he qualifies for PEC.  Earlier patient told staff that he did Meth.  Police claims he did not do crime at [employer].  And he has no charges.  Patient claims he used to work at [employer] and is now retired.  Patient wanted to be discharged and will walk home since it is daylight now.    The history is provided by the patient and the EMS personnel.     Review of patient's allergies indicates:  No Known Allergies  Past Medical History:   Diagnosis Date    Bipolar 1 disorder     Diabetes mellitus     Hypertension      No past surgical history on file.  No family history on file.  Social History     Tobacco Use    Smoking status: Every Day     Current packs/day: 1.00     Types: Cigarettes    Smokeless tobacco: Never   Substance Use Topics    Alcohol use: No     Alcohol/week: 0.0 standard drinks of alcohol    Drug use: Yes     Types: Cocaine     Review of Systems   Psychiatric/Behavioral:  Positive for behavioral problems and hallucinations.    All other systems reviewed and are negative.      Physical Exam     Initial Vitals [07/04/24 0600]   BP Pulse Resp Temp SpO2   122/68 70 18 98.4 °F (36.9 °C) 98 %      MAP       --         Physical Exam    Nursing note and vitals reviewed.  Constitutional: Vital signs are normal. He appears well-developed and well-nourished. He is active. No distress.   HENT:   Head: Normocephalic.   Nose: Nose normal.   Mouth/Throat: Oropharynx is clear and moist and mucous membranes are normal.   Eyes: Conjunctivae, EOM and lids are normal.   Neck: Neck supple.    Normal range of motion.  Cardiovascular:  Normal rate, regular rhythm, S1 normal, S2 normal and normal heart sounds.           Pulmonary/Chest: Breath sounds normal. No respiratory distress. He has no wheezes. He has no rales.   Abdominal: Abdomen is soft. Bowel sounds are normal.   Musculoskeletal:      Right upper arm: Normal.      Left upper arm: Normal.      Cervical back: Normal range of motion and neck supple.      Right lower leg: Normal.      Left lower leg: Normal.     Neurological: He is alert and oriented to person, place, and time. He has normal strength. GCS score is 15. GCS eye subscore is 4. GCS verbal subscore is 5. GCS motor subscore is 6.   Skin: Skin is warm. Capillary refill takes less than 2 seconds.   Psychiatric: His affect is labile. His speech is rapid and/or pressured. He is agitated. Thought content is paranoid and delusional. Cognition and memory are normal. He expresses impulsivity. He expresses homicidal ideation. He expresses no suicidal ideation. He expresses no suicidal plans and no homicidal plans.         ED Course   Procedures  Labs Reviewed   DRUG SCREEN PANEL, URINE EMERGENCY - Abnormal; Notable for the following components:       Result Value    Cocaine (Metab.) Presumptive Positive (*)     Amphetamine Screen, Ur Presumptive Positive (*)     All other components within normal limits    Narrative:     Preferred Collection Type->Urine, Clean Catch  Specimen Source->Urine   URINALYSIS, REFLEX TO URINE CULTURE - Abnormal; Notable for the following components:    Specific Gravity, UA >=1.030 (*)     Protein, UA 1+ (*)     Ketones, UA Trace (*)     Leukocytes, UA Trace (*)     All other components within normal limits    Narrative:     Preferred Collection Type->Urine, Clean Catch  Specimen Source->Urine   URINALYSIS MICROSCOPIC - Abnormal; Notable for the following components:    WBC, UA 10 (*)     Hyaline Casts, UA 5 (*)     All other components within normal limits    Narrative:      Preferred Collection Type->Urine, Clean Catch  Specimen Source->Urine   CBC W/ AUTO DIFFERENTIAL - Abnormal; Notable for the following components:    Gran % 74.3 (*)     Lymph % 13.6 (*)     All other components within normal limits   COMPREHENSIVE METABOLIC PANEL - Abnormal; Notable for the following components:    CO2 30 (*)     BUN 23 (*)     All other components within normal limits   ALCOHOL,MEDICAL (ETHANOL)   ACETAMINOPHEN LEVEL          Imaging Results    None          Medications   diphenhydrAMINE capsule 50 mg (50 mg Oral Given 7/4/24 1006)     Medical Decision Making  Differential diagnosis include not limited to acute psychosis, substance abuse, agitation, malingering,    Patient is having occasional hallucinations probably secondary to drug abuse.  Will place psychiatric consult.  After psychiatric consult patient is medically cleared to be discharged.  Patient safely discharged with family member.   management consultation regarding drug abuse.    Amount and/or Complexity of Data Reviewed  Labs: ordered. Decision-making details documented in ED Course.     Details: Cocaine positive, amphetamine positive,    Risk  OTC drugs.                                      Clinical Impression:  Final diagnoses:  [F23] Acute psychosis (Primary)  [F14.10] Cocaine abuse  [F15.10] Amphetamine abuse          ED Disposition Condition    Discharge Stable          ED Prescriptions    None       Follow-up Information    None          Molina Haque MD  07/06/24 0729

## 2024-07-04 NOTE — DISCHARGE INSTRUCTIONS
Thank you for coming in to see us at [facility] It was nice to meet you, and I hope you feel better soon. Please feel free to return to the ER at any time should your symptoms get worse, or if you have different emergent concerns.    Our goal in the emergency department is to always give you outstanding care and exceptional service. You may receive a survey by mail or e-mail in the next week regarding your experience in our ED. We would greatly appreciate your completing and returning the survey. Your feedback provides us with a way to recognize our staff who give very good care and it helps us learn how to improve when your experience was below our aspiration of excellence.      It is important to remember that some problems or medical conditions are difficult to diagnose and may not be found or addressed during your Emergency Department visit.  These conditions often start with non-specific symptoms and can only be diagnosed on follow up visits with your primary care physician or specialist when the symptoms continue or change. Please remember that all medical conditions can change, and we cannot predict how you will be feeling tomorrow or the next day.    Return to the ER with any questions/concerns, new/concerning symptoms, worsening, failure to improve or inability to obtain follow-up.       Be sure to follow up with your primary care doctor and review all labs/imaging/tests that were performed during your ER visit with them. It is very common for us to identify non-emergent incidental findings which must be followed up with your primary care physician.  Some labs/imaging/tests may be outside of the normal range, and require non-emergent follow-up and/or further investigation/treatment/procedures/testing to help diagnose/exclude/prevent complications or other potentially serious medical conditions. Some abnormalities may not have been discussed or addressed during your ER visit. Some lab  results may not return during your ER visit but can be accessible by downloading the free [facility] or by visiting [email or web address] . It is important for you to review all labs/imaging/tests which are outside of the normal range with your physician.    An ER visit does not replace a primary care visit, and many screening tests or follow-up tests cannot be ordered by an ER doctor or performed by the ER. Some tests may even require pre-approval.    If you do not have a primary care doctor, you may contact the one listed on your discharge paperwork or you may also call the [facility] Appointment Desk at [phone] , or [place] at  [phone] to schedule an appointment, or establish care with a primary care doctor or even a specialist and to obtain information about local resources. It is important to your health that you have a primary care doctor.    Please take all medications as directed. We have done our best to select a medication for you that will treat your condition however, all medications may potentially have side-effects and it is impossible to predict which medications may give you side-effects or what those side-effects (if any) those medications may give you.  If you feel that you are having a negative effect or side-effect of any medication you should stop taking those medications immediately and seek medical attention. If you feel that you are having a life-threatening reaction call 911.        Do not drive, swim, climb to height, take a bath, operate heavy machinery, drink alcohol or take potentially sedating medications, sign any legal documents or make any important decisions for 24 hours if you have received any pain medications, sedatives or mood altering drugs during your ER visit or within 24 hours of taking them if they have been prescribed to you.     You can find additional resources for Dentists, hearing aids, durable medical equipment, low cost pharmacies and  other resources at [email or web address]

## 2024-07-05 ENCOUNTER — PATIENT OUTREACH (OUTPATIENT)
Dept: ADMINISTRATIVE | Facility: OTHER | Age: 69
End: 2024-07-05
Payer: MEDICARE

## 2024-07-05 NOTE — PROGRESS NOTES
CHW - Outreach Attempt    Community Health Worker left a voicemail message for 1st attempt to contact patient regarding: community resources    Community Health Worker to attempt to contact patient on: 7/5/2024

## 2024-09-07 ENCOUNTER — HOSPITAL ENCOUNTER (EMERGENCY)
Facility: HOSPITAL | Age: 69
Discharge: HOME | End: 2024-09-07
Attending: EMERGENCY MEDICINE
Payer: MEDICARE

## 2024-09-07 VITALS
WEIGHT: 160 LBS | TEMPERATURE: 98 F | BODY MASS INDEX: 22.4 KG/M2 | HEART RATE: 83 BPM | OXYGEN SATURATION: 97 % | HEIGHT: 71 IN | RESPIRATION RATE: 18 BRPM | DIASTOLIC BLOOD PRESSURE: 79 MMHG | SYSTOLIC BLOOD PRESSURE: 131 MMHG

## 2024-09-07 DIAGNOSIS — F29 PSYCHOSIS, UNSPECIFIED PSYCHOSIS TYPE: ICD-10-CM

## 2024-09-07 DIAGNOSIS — F14.10 COCAINE ABUSE: Primary | ICD-10-CM

## 2024-09-07 LAB
ALBUMIN SERPL BCP-MCNC: 4.2 G/DL (ref 3.5–5.2)
ALP SERPL-CCNC: 65 U/L (ref 38–126)
ALT SERPL W/O P-5'-P-CCNC: 21 U/L (ref 10–44)
AMPHET+METHAMPHET UR QL: NEGATIVE
ANION GAP SERPL CALC-SCNC: 10 MMOL/L (ref 8–16)
APAP SERPL-MCNC: <10 UG/ML (ref 10–20)
AST SERPL-CCNC: 24 U/L (ref 15–46)
BACTERIA #/AREA URNS AUTO: NORMAL /HPF
BARBITURATES UR QL SCN>200 NG/ML: NEGATIVE
BASOPHILS # BLD AUTO: 0.07 K/UL (ref 0–0.2)
BASOPHILS NFR BLD: 1 % (ref 0–1.9)
BENZODIAZ UR QL SCN>200 NG/ML: NEGATIVE
BILIRUB SERPL-MCNC: 0.5 MG/DL (ref 0.1–1)
BILIRUB UR QL STRIP: ABNORMAL
BZE UR QL SCN: ABNORMAL
CALCIUM SERPL-MCNC: 9.6 MG/DL (ref 8.7–10.5)
CANNABINOIDS UR QL SCN: NEGATIVE
CHLORIDE SERPL-SCNC: 100 MMOL/L (ref 95–110)
CLARITY UR REFRACT.AUTO: CLEAR
CO2 SERPL-SCNC: 26 MMOL/L (ref 23–29)
COLOR UR AUTO: YELLOW
CREAT SERPL-MCNC: 1 MG/DL (ref 0.5–1.4)
CREAT UR-MCNC: 299 MG/DL (ref 23–375)
DIFFERENTIAL METHOD BLD: ABNORMAL
EOSINOPHIL # BLD AUTO: 0.1 K/UL (ref 0–0.5)
EOSINOPHIL NFR BLD: 0.7 % (ref 0–8)
ERYTHROCYTE [DISTWIDTH] IN BLOOD BY AUTOMATED COUNT: 12.7 % (ref 11.5–14.5)
EST. GFR  (NO RACE VARIABLE): >60 ML/MIN/1.73 M^2
ETHANOL SERPL-MCNC: <10 MG/DL
GLUCOSE SERPL-MCNC: 121 MG/DL (ref 70–110)
GLUCOSE UR QL STRIP: NEGATIVE
HCT VFR BLD AUTO: 39.3 % (ref 40–54)
HGB BLD-MCNC: 13.5 G/DL (ref 14–18)
HGB UR QL STRIP: ABNORMAL
HYALINE CASTS UR QL AUTO: 0 /LPF
IMM GRANULOCYTES # BLD AUTO: 0.02 K/UL (ref 0–0.04)
IMM GRANULOCYTES NFR BLD AUTO: 0.3 % (ref 0–0.5)
KETONES UR QL STRIP: ABNORMAL
LEUKOCYTE ESTERASE UR QL STRIP: NEGATIVE
LYMPHOCYTES # BLD AUTO: 1 K/UL (ref 1–4.8)
LYMPHOCYTES NFR BLD: 14.9 % (ref 18–48)
MCH RBC QN AUTO: 30.9 PG (ref 27–31)
MCHC RBC AUTO-ENTMCNC: 34.4 G/DL (ref 32–36)
MCV RBC AUTO: 90 FL (ref 82–98)
METHADONE UR QL SCN>300 NG/ML: NEGATIVE
MICROSCOPIC COMMENT: NORMAL
MONOCYTES # BLD AUTO: 0.4 K/UL (ref 0.3–1)
MONOCYTES NFR BLD: 6.3 % (ref 4–15)
NEUTROPHILS # BLD AUTO: 5.3 K/UL (ref 1.8–7.7)
NEUTROPHILS NFR BLD: 76.8 % (ref 38–73)
NITRITE UR QL STRIP: NEGATIVE
NRBC BLD-RTO: 0 /100 WBC
OPIATES UR QL SCN: NEGATIVE
PCP UR QL SCN>25 NG/ML: NEGATIVE
PH UR STRIP: 6 [PH] (ref 5–8)
PLATELET # BLD AUTO: 289 K/UL (ref 150–450)
PMV BLD AUTO: 9.6 FL (ref 9.2–12.9)
POTASSIUM SERPL-SCNC: 3.2 MMOL/L (ref 3.5–5.1)
PROT SERPL-MCNC: 7 G/DL (ref 6–8.4)
PROT UR QL STRIP: ABNORMAL
RBC # BLD AUTO: 4.37 M/UL (ref 4.6–6.2)
RBC #/AREA URNS AUTO: 0 /HPF (ref 0–4)
SARS-COV-2 RDRP RESP QL NAA+PROBE: NEGATIVE
SODIUM SERPL-SCNC: 136 MMOL/L (ref 136–145)
SP GR UR STRIP: >=1.03 (ref 1–1.03)
TOXICOLOGY INFORMATION: ABNORMAL
URN SPEC COLLECT METH UR: ABNORMAL
UROBILINOGEN UR STRIP-ACNC: NEGATIVE EU/DL
UUN UR-MCNC: 15 MG/DL (ref 2–20)
WBC # BLD AUTO: 6.84 K/UL (ref 3.9–12.7)
WBC #/AREA URNS AUTO: 2 /HPF (ref 0–5)

## 2024-09-07 PROCEDURE — U0002 COVID-19 LAB TEST NON-CDC: HCPCS | Mod: ER | Performed by: EMERGENCY MEDICINE

## 2024-09-07 PROCEDURE — 99900035 HC TECH TIME PER 15 MIN (STAT): Mod: ER

## 2024-09-07 PROCEDURE — 93005 ELECTROCARDIOGRAM TRACING: CPT | Mod: ER

## 2024-09-07 PROCEDURE — 80143 DRUG ASSAY ACETAMINOPHEN: CPT | Mod: ER | Performed by: EMERGENCY MEDICINE

## 2024-09-07 PROCEDURE — 99284 EMERGENCY DEPT VISIT MOD MDM: CPT | Mod: 25,ER

## 2024-09-07 PROCEDURE — 82077 ASSAY SPEC XCP UR&BREATH IA: CPT | Mod: ER | Performed by: EMERGENCY MEDICINE

## 2024-09-07 PROCEDURE — 80053 COMPREHEN METABOLIC PANEL: CPT | Mod: ER | Performed by: EMERGENCY MEDICINE

## 2024-09-07 PROCEDURE — 81000 URINALYSIS NONAUTO W/SCOPE: CPT | Mod: ER | Performed by: EMERGENCY MEDICINE

## 2024-09-07 PROCEDURE — 85025 COMPLETE CBC W/AUTO DIFF WBC: CPT | Mod: ER | Performed by: EMERGENCY MEDICINE

## 2024-09-07 PROCEDURE — 80307 DRUG TEST PRSMV CHEM ANLYZR: CPT | Mod: ER | Performed by: EMERGENCY MEDICINE

## 2024-09-07 NOTE — ED PROVIDER NOTES
Encounter Date: 9/7/2024       History     Chief Complaint   Patient presents with    Psychiatric Evaluation     EMS states pt was walking by the side of the road naked. PT VH, denies SI and HI     HPI  68 y.o.   Chart review shows psychiatric hx, pt denies taking psychiatric meds  He did use a large amount of crack cocaine this AM  And states he saw grasshoppers in his house and was walking around naked  For this, he was brought into ED    He states this was at his house  He denies SI HI or command hallucinations    Review of patient's allergies indicates:  No Known Allergies  Past Medical History:   Diagnosis Date    Bipolar 1 disorder     Diabetes mellitus     Hypertension      History reviewed. No pertinent surgical history.  No family history on file.  Social History     Tobacco Use    Smoking status: Every Day     Current packs/day: 1.00     Types: Cigarettes    Smokeless tobacco: Never   Substance Use Topics    Alcohol use: No     Alcohol/week: 0.0 standard drinks of alcohol    Drug use: Yes     Types: Cocaine     Review of Systems  All systems were reviewed/examined and were negative except as noted in the HPI.    Physical Exam     Initial Vitals [09/07/24 1810]   BP Pulse Resp Temp SpO2   131/79 83 18 97.6 °F (36.4 °C) 97 %      MAP       --         Physical Exam    General: the patient is awake, alert, and in no apparent distress.  Head: normocephalic and atraumatic, sclera are clear  Neck: supple without meningismus  Chest: clear to auscultation bilaterally, no respiratory distress  Heart: regular rate and rhythm  ABD soft, nontender, nondistended, no peritoneal signs  Extremities: warm and well perfused  Skin: warm and dry  Psych conversant  calm, not internally stimulated, wearing black shorts only  Neuro: awake, alert, moving all extremities    ED Course   Procedures  Labs Reviewed   CBC W/ AUTO DIFFERENTIAL - Abnormal       Result Value    WBC 6.84      RBC 4.37 (*)     Hemoglobin 13.5 (*)      Hematocrit 39.3 (*)     MCV 90      MCH 30.9      MCHC 34.4      RDW 12.7      Platelets 289      MPV 9.6      Immature Granulocytes 0.3      Gran # (ANC) 5.3      Immature Grans (Abs) 0.02      Lymph # 1.0      Mono # 0.4      Eos # 0.1      Baso # 0.07      nRBC 0      Gran % 76.8 (*)     Lymph % 14.9 (*)     Mono % 6.3      Eosinophil % 0.7      Basophil % 1.0      Differential Method Automated     COMPREHENSIVE METABOLIC PANEL - Abnormal    Sodium 136      Potassium 3.2 (*)     Chloride 100      CO2 26      Glucose 121 (*)     BUN 15      Creatinine 1.00      Calcium 9.6      Total Protein 7.0      Albumin 4.2      Total Bilirubin 0.5      Alkaline Phosphatase 65      AST 24      ALT 21      Anion Gap 10      eGFR >60.0     URINALYSIS, REFLEX TO URINE CULTURE - Abnormal    Specimen UA Urine, Clean Catch      Color, UA Yellow      Appearance, UA Clear      pH, UA 6.0      Specific Gravity, UA >=1.030 (*)     Protein, UA 1+ (*)     Glucose, UA Negative      Ketones, UA Trace (*)     Bilirubin (UA) 1+ (*)     Occult Blood UA Trace (*)     Nitrite, UA Negative      Urobilinogen, UA Negative      Leukocytes, UA Negative      Narrative:     Preferred Collection Type->Urine, Clean Catch  Specimen Source->Urine   DRUG SCREEN PANEL, URINE EMERGENCY - Abnormal    Benzodiazepines Negative      Methadone metabolites Negative      Cocaine (Metab.) Presumptive Positive (*)     Opiate Scrn, Ur Negative      Barbiturate Screen, Ur Negative      Amphetamine Screen, Ur Negative      THC Negative      Phencyclidine Negative      Creatinine, Urine 299.0      Toxicology Information SEE COMMENT      Narrative:     Preferred Collection Type->Urine, Clean Catch  Specimen Source->Urine   ALCOHOL,MEDICAL (ETHANOL)    Alcohol, Serum <10     ACETAMINOPHEN LEVEL    Acetaminophen (Tylenol), Serum <10.0     SARS-COV-2 RNA AMPLIFICATION, QUAL    SARS-CoV-2 RNA, Amplification, Qual Negative     URINALYSIS MICROSCOPIC    RBC, UA 0      WBC, UA 2       Bacteria None      Hyaline Casts, UA 0      Microscopic Comment SEE COMMENT      Narrative:     Preferred Collection Type->Urine, Clean Catch  Specimen Source->Urine          Imaging Results    None          Medications - No data to display  Medical Decision Making  Amount and/or Complexity of Data Reviewed  Labs: ordered.    Medical Decision Making:    This is an emergent evaluation of a patient presenting to the ED.  Nursing notes were reviewed.  I personally reviewed, read, and interpreted the ECG and any monitoring strips.  ECG: normal EKG, normal sinus rhythm, unchanged from previous tracings   There are no critical interval prolongations nor critical ST-segment ischemic changes.  Compared with prior if available.  Read and interpreted by me independently.    Cbc cmp etoh non critical  I personally reviewed and interpreted the laboratory results.  Communicated with another physician regarding patient's care: psychiatry, does not recommend inpatient admission  I decided to obtain and review old medical records, which showed: h/o similar    Evaluation for Emergency Medical Condition  The patient received a medical screening exam and within a reasonable degree of clinical confidence an emergency medical condition has not been identified.  The patient is instructed on proper follow up and return precautions to the ED.      Luis Browning MD, JEANNIE                                    Clinical Impression:  Final diagnoses:  [F14.10] Cocaine abuse (Primary)  [F29] Psychosis, unspecified psychosis type          ED Disposition Condition    Discharge           ED Prescriptions    None       Follow-up Information       Follow up With Specialties Details Why Contact Info    Rafia Stubbs, NP Family Medicine Schedule an appointment as soon as possible for a visit   [address]  [address]  [facility] [place] [address] [phone]            Discharged to home in stable condition, return to ED warnings  given, follow up and patient care instructions given.      Luis Browning MD, JEANNIE, FACEP  Department of Emergency Medicine       Dickson Browning MD  09/09/24 0154

## 2024-09-08 NOTE — ED NOTES
Spoke to pt's family member Darline Mahmood, updated pt on his care and informed that pt was ready for . Darline states she's on the way to get him.   Pt AAOx4. Denies Hallucinations. Denies SI. VSS. No acute distressed noted. Pt discharged to waiting area.

## 2024-09-08 NOTE — CONSULTS
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  Available documentation has been reviewed, and pertinent elements of the chart have been incorporated into this evaluation where appropriate.    CONSULT START TIME: 9/7/2024 7:29 PM  CONSULT END TIME: 9/7/2024 8:44 PM    -- PATIENT IDENTIFIERS: Braxton Ayoub  [id number]  1955  68 y.o.  male  -- REQUESTING PROVIDER: No att. providers found *  -- SETTING: emergency department  -- MODE OF ARRIVAL: ambulance  -- SOURCES OF INFORMATION: PATIENT, EHR/chart  -- PRESENT WITH PATIENT DURING EVALUATION: ALONE  -- CONSULTANT PROVIDER: Nayely Zuleta MD  -- LOCATION OF CONSULTANT: [place], [place]    -- LOCATION OF PATIENT: [facility] EMERGENCY DEPARTMENT     History & Physical  Psychiatry    9/7/2024  MRN: [id number]  Reason for Consult: Recs re: Inpatient admission vs outpatient referrals, cocaine induced psychosis   Consult placed by Dr. Dickson Browning the ED MD    Consulting Psychiatrist Location: [place], [place]    Pt arrived in the ED at 1814    History of Present Illness:   Braxton Ayoub is a 68 y.o. male with past psychiatric history of stimulant abuse, stimulant induced psychosis. BIB today by EMS after he was found walking by the side of the road naked, pt endorsed using a lot of cocaine this morning, was seeing grasshoppers around his house.       Psychiatry Interview  Time started: 1929  Pt seen via secure video visit. Pt is calm and cooperative. Reports he is here because he "kind of freaked out." Notes he started seeing bugs. Had never happened before. Was using cocaine this morning and suspects the two are connected. Used cocaine mid morning. Also " "notes he hadn't really eaten anything today, only ate once yesterday. No longer seeing bugs or other unusual experiences now.   Has used cocaine on and off for a while, has gone to treatment before. Thinks longest sober time was "10-13 weeks." Tried meth for the first time a few months ago, says he was taking care of his mother who was dying, just wanted to get the first thing he could to use, wasn't a good experience. Was off drugs for a few weeks, went back to using about 3 weeks ago. Acknowledges he needs to go back to treatment, has gone to a program before, had already called them, has played some phone tag with them, plans to go Monday or Tuesday. Would like to work on some "deeply rooted issues."   Hasn't slept in "a while," attributes this to cocaine. Denies suicidal ideaiton, says he has had thoughts of not wanting to be alive anymore, but never acted on them, doesn't think he ever would, feels like there is some kind of higher power that keeps him living. Denies taking any psychiatric medications.   Lives alone in an apartment. Does have friends that he speaks to about what he is experiencing. Does note that he doesn't really have access to transportation to get home, could call a couple friends (number in the chart).     Time Ended: 19:43 PM      Past Medical History:   Diagnosis Date    Bipolar 1 disorder     Diabetes mellitus     Hypertension          Review of patient's allergies indicates:  No Known Allergies    Scheduled Meds:        Past Psychiatric History:  Previous Medication Trials:  Denies    Previous Psychiatric Hospitalizations: yes, 2016 for depression in context of substance use, prior notes of similar admissions in 2003, 2008  Previous Suicide Attempts: no   History of Violence: no  Outpatient Psychiatrist: no    Social History:  Marital Status: single  Children: 0   Employment Status/Info:  Not currently employed  Education:  [employer]  Housing Status: Appartment  Access to gun: " "no    Substance Abuse History:  Recreational Drugs: cocaine and methamphetamines  Use of Alcohol: denied  Rehab History:yes   Tobacco Use:yes      Legal History:  Past Charges/Incarcerations: Very remote history of incarceration after breaking into a flower shop in the 80s ,    Pending charges:no       Family Psychiatric History:   Non contributory    OBJECTIVE:     Vitals:    09/07/24 1810   BP: 131/79   Pulse: 83   Resp: 18   Temp: 97.6 °F (36.4 °C)         Recent Labs   Lab 09/07/24  1839   *   CALCIUM 9.6   ALBUMIN 4.2   PROT 7.0      K 3.2*   CO2 26      BUN 15   CREATININE 1.00   ALKPHOS 65   ALT 21   AST 24   BILITOT 0.5     Lab Results   Component Value Date    WBC 6.84 09/07/2024    HGB 13.5 (L) 09/07/2024    HCT 39.3 (L) 09/07/2024    MCV 90 09/07/2024     09/07/2024     Lab Results   Component Value Date    LITHIUM <0.2 (L) 05/16/2016     09/07/2024    BUN 15 09/07/2024    CREATININE 1.00 09/07/2024    TSH 1.535 06/30/2017    WBC 6.84 09/07/2024     Lab Results   Component Value Date    VALPROATE <10.0 (L) 05/16/2016     Urinalysis  Recent Labs   Lab 09/07/24  1836   COLORU Yellow   SPECGRAV >=1.030*   PHUR 6.0   PROTEINUA 1+*   BACTERIA None   NITRITE Negative   LEUKOCYTESUR Negative   UROBILINOGEN Negative   HYALINECASTS 0     No results for input(s): "POCTGLUCOSE" in the last 72 hours.      Mental Status Exam:  Appearance: older than stated age, overweight, thin & gaunt looking, edentulous, abnormal mouth movements  Behavior/Cooperation: cooperative  Speech: articulation error, soft, mumbled, hard to understand at times  Mood:  "I just kind of freaked out"  Affect: Constricted  Thought Process: normal and logical, concrete  Thought Content:  Denies SI, no HI. Reports VH earlier today that have resolved  Sensorium: grossly intact  Cognition: grossly intact  Insight: fair  Judgment: limited      ASSESSMENT/PLAN:   Braxton Ayoub is a 68 y.o. male with a hx of stimulant use " d/o, previous presentations for stimulant induced psychosis, more distant admissions for SI in context of substance use. BIB EMS today after being found walking naked on the side of the road, endorsing hallucinations of grasshoppers, in context of cocaine use. Hallucinations resolved, pt with insight into connection between stimulants and symptoms. Not suicidal. Reports having already reached out to previous treatment program with plan to return. No indication for PEC, pt not danger to self or others.      DSM 5 DDX  Stimulant use d/o  Stimulant induced psychosis    Recommendations:    Legal:Rescind PEC because pt is no longer in any imminent danger of hurting self or others and not gravely disabled. Pt currently does not meet criteria nor benefit from from involuntary inpatient psychiatric admission.   2. Encouraged pt to follow up with plan to re-engage with addiction treatment and abstain from substances.     Time with patient: 14 min  Additional time (chart review, documentation, communication): 25 min    Recommendations discussed with consulting team.     Nayely Zuleta MD  [facility]  9/7/2024

## 2024-09-09 LAB
OHS QRS DURATION: 94 MS
OHS QTC CALCULATION: 450 MS